# Patient Record
Sex: MALE | Race: WHITE | NOT HISPANIC OR LATINO | URBAN - METROPOLITAN AREA
[De-identification: names, ages, dates, MRNs, and addresses within clinical notes are randomized per-mention and may not be internally consistent; named-entity substitution may affect disease eponyms.]

---

## 2022-11-22 ENCOUNTER — HOSPITAL ENCOUNTER (OUTPATIENT)
Dept: INTERVENTIONAL RADIOLOGY/VASCULAR | Facility: HOSPITAL | Age: 57
End: 2022-11-22

## 2022-11-30 ENCOUNTER — HOSPITAL ENCOUNTER (OUTPATIENT)
Dept: INTERVENTIONAL RADIOLOGY/VASCULAR | Facility: HOSPITAL | Age: 57
Discharge: HOME | End: 2022-11-30
Admitting: RADIOLOGY

## 2022-11-30 DIAGNOSIS — I87.2 VENOUS INSUFFICIENCY: ICD-10-CM

## 2022-11-30 PROCEDURE — G0463 HOSPITAL OUTPT CLINIC VISIT: HCPCS

## 2022-12-01 NOTE — H&P
[facility]   Vascular Interventional Radiology  Consult Note    Patient Name: Chad Ni  : 1965  MRN: [id number]  Primary Care Physician:  Anish Roland MD  Date of Consult: 2022     Subjective     Reason for Consult: Chronic venous insufficiency of the left lower extremity    History of Present Illness:    Chad Ni is a 57 y.o. very pleasant male referred to IR as noted above.    The patient presents with a long-term history of now advanced lower extremity venous insufficiency in the setting of previous deep vein thrombosis. The patient has a strong family history of deep vein thrombosis in his grand father. The patient suffered lower extremity deep vein thrombosis at age 17. The patient suffered a pulmonary embolism in . It was accompanied by placement of reportedly a [place] filter. The patient was also treated with Pradaxa. 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 The patient has been maintained on oral Warfarin ever since. There has been no known recurrence of a deep vein thrombosis.    There has been progressive swelling of the left lower extremity over the past many years, especially worse in the past one year. The patient has developed pain, heaviness, itching, flaking, pigmentation, previous superficial ulcerations. The patient's occupation requires him to be on his feet for majority of the time even with recent concessions created for him at his job. The symptoms of pain and heaviness grow progressively worse throughout the day and have worsened over the long-term. It has become hard for the patient to even walk for a significant distance. The patient does not use elastic compression stockings. The patient has been using Ace wrap around his ankle and calf which is potentially making the situation worse due to lack of graduated compression.  In addition to the effect on his work, there is impact on his quality of life, ability to walk and exercise and has contributed to weight gain.    The patient has seen Dr. Wayne Em of [facility] on July 17 and then on August 22 of this year. 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    The patient underwent bilateral lower extremity duplex ultrasound scan on August 8, 2022. There was grade 4 reflux at the left saphenofemoral junction and the left great saphenous vein. There was no evidence of deep vein thrombosis in the right or left lower extremity. There was no reflux in the right lower extremity superficial venous system.     Reportedly the patient had a CT angiogram venous phase of the abdomen and pelvis based on the office note from Dr. Em dated 8/24/2022. The patient believes that it was a noncontrast CT as the IV contrast could not be administered for consideration of renal disease. Based on the office note, there was no evidence of venous thrombosis or May Thurner on the CT. The IVC filter was seen in the expected location.       An Echocardiogram showed LVEF of 55% based on the clinic notes from Dr. Roland.    The patient was recommended endovenous laser ablation of the left great saphenous vein and use of elastic compression stockings in the interim, reportedly with an upfront fee for service.     The patient is interested in a second opinion.    Other current medical problems include CKD stage 2-3, paroxysmal atrial fibrillation, hypertension, diabetes, hyperlipidemia, right foot drop, diabetic neuropathy.    The patient has had surgery for left knee, hernia repair, colon polyp and esophageal benign lymphoid tissue removal in addition to the surgeries mentioned above.    PMH: As mentioned above.    PSH: As mentioned above.    Review of Systems      Constitutional: No change in appetite. No fever, no chills.  Ophthalmologic: No blurred vision. No eye pain. No discharge.  ENT: No hearing loss. No throat discomfort. No swollen glands.  Endocrine: No cold intolerance. No excessive thirst. No heat intolerance. No weight loss.  RESPIRATORY: No cough, shortness of breath, dyspnea on exertion, wheezing.  CARDIOVASCULAR: No chest pain, irregular heartbeat, shortness of breath.  GASTROINTESTINAL: Abdominal pain, nausea, vomiting, diarrhea.  : No hematuria, dysuria, frequent urination.  MUSCULOSKELETAL: Above.  SKIN: Above.  NEUROLOGIC: No dizziness, fainting, headaches.    Family History: Mentioned above. Also family history of hypertension.    Social History: He  has no history on file for tobacco use, alcohol use, and drug use.    Home Medications:    Ambien, Bumetanide, Ropinirol, Cyclobenzaprine, Nifedipine ER, Farsiga, Mag64, Atorvastatin, Clonidine, Warfarin, Gabapentin, Sodium bicarbonate, Glipizide, Allopurinol. Please see a copy of note from Dr. Roland in the media section of EMR for the details and dosages.      Allergies:  He has no allergies on file.    Objective    Objective     Vitals:     No VS were recoreded.     Physical Exam    General: well appearing person in no acute distress. Moderate obesity.  Neuro: alert and oriented. Moves all 4 extremities.  HEENT: sclera anicteric.  Extremeties: Left lower extremity with obvious edema, below the knee dry skin with flakiness, extensive hemosiderin pigmentation even visible under/between the wraps of the ACE bandage around the distal calf and ankle.  Psych: mood and affect appropriate.   CVS: Regular rate and rhythm  Respiratory: Non labored breathing. No signs of respiratory compromise.    Result Review    Please see above regarding the imaging results.    Assessment / Plan     Brief Patient Summary:  Chad Ni is a 57 y.o. male with long standing and progressive left lower extremity venous  insufficiency with serious impact on quality of life and functional and occupational ability. The CVI is likely due to post thrombotic syndrome and left great saphenous venous insufficiency. There is no current DVT, May Thurner or IVC filter related IVC compromise based on the available information.    CEAP: C5 Es As/Ap Pr    Plan:   1. Obtain CD of prior imaging and review the findings. The patient will provide us with a CD via mail.  2. Use Grade 2 knee high elastic compression stockings on a daily basis.  3. Insurance authorization for LGSV EVLA and injection sclerotherapy.  4. Performance of the above procedures.  5. Life long use of knee high ECS.     I discussed with the patient the pathophysiology of LE venous insufficiency and it's risks, the need and use of ECS, adjustment to life style, work posture, light exercise, and weight loss.    The patient is very interested in moving ahead.    I gave him written information re ECS for which he is going to shop around and find the best deal. I will write him a prescription if he needs one.     Thank you for the opportunity to assist me in the care of this very pleasant person.     Total time of patient care on day of service including time prior to, face to face with patient, and in reviewing records, lab results, discussion with patient and documentation/charting was 45 minutes.    Luc Mendoza MD  Vascular Interventional Radiology  12/01/22   8:48 AM EST

## 2022-12-13 ENCOUNTER — HOSPITAL ENCOUNTER (OUTPATIENT)
Dept: INTERVENTIONAL RADIOLOGY/VASCULAR | Facility: HOSPITAL | Age: 57
Discharge: HOME | End: 2022-12-13

## 2022-12-13 NOTE — PROGRESS NOTES
I had a telephone conversation with the patient at 12:02 PM today through his cell phone. The patient is doing well. He has been using elastic compression stocking for a week with significant improvement in his left leg swelling and quality of life symptoms. I told the patient that I had received all of his imaging from the outside institutions and have reviewed. He would be a good candidate for left great saphenous vein endovenous laser ablation. I explained to the patient that we await the insurance authorization for the procedure. It might take up to 3 months.    The patient had questions about the procedure and the recovery period. I told him that he would use elastic compression stocking after the procedure and ambulate for at least 30 minutes a day for at least 10 days starting the day of the procedure. He would also be required to avoid heavy lifting and straining for at least 10 days after the procedure. There is no other real restriction on ambulation after the procedure.    We agreed to keep the patient posted on the insurance authorization.

## 2023-03-21 ENCOUNTER — TELEPHONE (OUTPATIENT)
Dept: INFUSION THERAPY | Facility: HOSPITAL | Age: 58
End: 2023-03-21
Payer: COMMERCIAL

## 2023-03-21 NOTE — TELEPHONE ENCOUNTER
" Attempting to contact pt.  To confirm he had obtained compression hose to wear post procedure on Friday.    Called pt's  Contact numbers home and mobile, received. Message \" this phone is no longer in service\"on both phones. Notified DR Ventura and Dr Em(previous provider) office to check for possible different number. The numbers the offices had on file were the same.  "

## 2023-03-23 ENCOUNTER — TELEPHONE (OUTPATIENT)
Dept: INFUSION THERAPY | Facility: HOSPITAL | Age: 58
End: 2023-03-23
Payer: COMMERCIAL

## 2023-03-23 NOTE — TELEPHONE ENCOUNTER
Ms. Ni returned call to outpatient prep and recovery. She was able to locate 20-30 strength compression waist high stockings and was calling to confirm that the strength was appropriate as we has instructed her he needed grade 2-3. Contacted Dr. Mendoza to confirm this was the correct compression strength, and informed Ms. Ni that this was appropriate for what is required post procedure. Ms. Ni voiced concerns about the price being $57 and insurance not covering the stockings and she asked how many pairs he would be needing. Explained that per Dr. Mendoza's office note, it states he will be required to wear the compression stockings for 10 days post procedure. She verbalized understanding. She then questioned whether he could return to work, read to her from Dr. Mendoza's office note verbatim dated 12/13/22 re: post procedure restrictions and ambulation requirements. Reassured her that further questions could be answered/clarified tomorrow prior to discharge.

## 2023-03-23 NOTE — TELEPHONE ENCOUNTER
Pt's wife contacted outpatient prep and recovery from the medical supply store to let us know that they did not carry the correct size. After discussing with Dr. Mendoza, contacted Ms. Ni back to explain to her that we would need to reschedule the procedure once the patient has the waist high compression stockings at grade 2-3 (20-30 strength) compression as without this in place it sets patient up for a poor outcome post-procedure. Ms. Ni expressed her frustration that this was left until the last minute before she was made aware that they would need to secure these compression garments.

## 2023-03-23 NOTE — TELEPHONE ENCOUNTER
Attempted to contact patient at [phone] as well as [phone]. Left messages at both numbers for someone to contact outpatient prep and recovery ASAP at [phone] concerning compression hose.